# Patient Record
Sex: MALE | Race: BLACK OR AFRICAN AMERICAN | Employment: FULL TIME | URBAN - METROPOLITAN AREA
[De-identification: names, ages, dates, MRNs, and addresses within clinical notes are randomized per-mention and may not be internally consistent; named-entity substitution may affect disease eponyms.]

---

## 2024-04-07 ENCOUNTER — APPOINTMENT (OUTPATIENT)
Dept: CT IMAGING | Facility: HOSPITAL | Age: 29
End: 2024-04-07
Attending: EMERGENCY MEDICINE

## 2024-04-07 ENCOUNTER — HOSPITAL ENCOUNTER (INPATIENT)
Facility: HOSPITAL | Age: 29
LOS: 2 days | Discharge: HOME | End: 2024-04-09
Attending: EMERGENCY MEDICINE | Admitting: HOSPITALIST

## 2024-04-07 DIAGNOSIS — R74.01 TRANSAMINITIS: Primary | ICD-10-CM

## 2024-04-07 DIAGNOSIS — D75.1 POLYCYTHEMIA: ICD-10-CM

## 2024-04-07 LAB
ALBUMIN SERPL-MCNC: 5.2 G/DL (ref 3.2–4.8)
ALP LIVER SERPL-CCNC: 73 U/L
ALT SERPL-CCNC: 1561 U/L
ANION GAP SERPL CALC-SCNC: 12 MMOL/L (ref 0–18)
AST SERPL-CCNC: 627 U/L (ref ?–34)
BILIRUB DIRECT SERPL-MCNC: 0.6 MG/DL (ref ?–0.3)
BILIRUB SERPL-MCNC: 1.3 MG/DL (ref 0.3–1.2)
BILIRUB UR QL: NEGATIVE
BUN BLD-MCNC: 22 MG/DL (ref 9–23)
BUN/CREAT SERPL: 13.8 (ref 10–20)
CALCIUM BLD-MCNC: 9.9 MG/DL (ref 8.7–10.4)
CHLORIDE SERPL-SCNC: 93 MMOL/L (ref 98–112)
CLARITY UR: CLEAR
CO2 SERPL-SCNC: 30 MMOL/L (ref 21–32)
COLOR UR: YELLOW
CREAT BLD-MCNC: 1.59 MG/DL
EGFRCR SERPLBLD CKD-EPI 2021: 60 ML/MIN/1.73M2 (ref 60–?)
GLUCOSE BLD-MCNC: 108 MG/DL (ref 70–99)
GLUCOSE UR-MCNC: NORMAL MG/DL
HAV IGM SER QL: NONREACTIVE
HBV CORE IGM SER QL: NONREACTIVE
HBV SURFACE AG SERPL QL IA: NONREACTIVE
HCV AB SERPL QL IA: NONREACTIVE
HYALINE CASTS #/AREA URNS AUTO: PRESENT /LPF
KETONES UR-MCNC: 80 MG/DL
LEUKOCYTE ESTERASE UR QL STRIP.AUTO: NEGATIVE
LIPASE SERPL-CCNC: 46 U/L (ref 13–75)
NITRITE UR QL STRIP.AUTO: NEGATIVE
OSMOLALITY SERPL CALC.SUM OF ELEC: 284 MOSM/KG (ref 275–295)
PH UR: 6 [PH] (ref 5–8)
POTASSIUM SERPL-SCNC: 3.1 MMOL/L (ref 3.5–5.1)
PROT SERPL-MCNC: 9.3 G/DL (ref 5.7–8.2)
PROT UR-MCNC: 100 MG/DL
SODIUM SERPL-SCNC: 135 MMOL/L (ref 136–145)
SP GR UR STRIP: >1.03 (ref 1–1.03)
UROBILINOGEN UR STRIP-ACNC: 3

## 2024-04-07 PROCEDURE — 85060 BLOOD SMEAR INTERPRETATION: CPT | Performed by: EMERGENCY MEDICINE

## 2024-04-07 PROCEDURE — 96374 THER/PROPH/DIAG INJ IV PUSH: CPT

## 2024-04-07 PROCEDURE — 74177 CT ABD & PELVIS W/CONTRAST: CPT | Performed by: EMERGENCY MEDICINE

## 2024-04-07 PROCEDURE — 80048 BASIC METABOLIC PNL TOTAL CA: CPT | Performed by: EMERGENCY MEDICINE

## 2024-04-07 PROCEDURE — 99285 EMERGENCY DEPT VISIT HI MDM: CPT

## 2024-04-07 PROCEDURE — 96361 HYDRATE IV INFUSION ADD-ON: CPT

## 2024-04-07 PROCEDURE — 80076 HEPATIC FUNCTION PANEL: CPT | Performed by: EMERGENCY MEDICINE

## 2024-04-07 PROCEDURE — 85025 COMPLETE CBC W/AUTO DIFF WBC: CPT | Performed by: EMERGENCY MEDICINE

## 2024-04-07 PROCEDURE — 80074 ACUTE HEPATITIS PANEL: CPT | Performed by: EMERGENCY MEDICINE

## 2024-04-07 PROCEDURE — 81001 URINALYSIS AUTO W/SCOPE: CPT | Performed by: EMERGENCY MEDICINE

## 2024-04-07 PROCEDURE — 96375 TX/PRO/DX INJ NEW DRUG ADDON: CPT

## 2024-04-07 PROCEDURE — 83690 ASSAY OF LIPASE: CPT | Performed by: EMERGENCY MEDICINE

## 2024-04-07 RX ORDER — HEPARIN SODIUM 5000 [USP'U]/ML
5000 INJECTION, SOLUTION INTRAVENOUS; SUBCUTANEOUS EVERY 8 HOURS SCHEDULED
Status: DISCONTINUED | OUTPATIENT
Start: 2024-04-07 | End: 2024-04-09

## 2024-04-07 RX ORDER — BISACODYL 10 MG
10 SUPPOSITORY, RECTAL RECTAL
Status: DISCONTINUED | OUTPATIENT
Start: 2024-04-07 | End: 2024-04-09

## 2024-04-07 RX ORDER — ONDANSETRON 2 MG/ML
4 INJECTION INTRAMUSCULAR; INTRAVENOUS ONCE
Status: COMPLETED | OUTPATIENT
Start: 2024-04-07 | End: 2024-04-07

## 2024-04-07 RX ORDER — ACETAMINOPHEN 500 MG
500 TABLET ORAL EVERY 4 HOURS PRN
Status: DISCONTINUED | OUTPATIENT
Start: 2024-04-07 | End: 2024-04-08

## 2024-04-07 RX ORDER — MORPHINE SULFATE 2 MG/ML
2 INJECTION, SOLUTION INTRAMUSCULAR; INTRAVENOUS ONCE
Status: COMPLETED | OUTPATIENT
Start: 2024-04-07 | End: 2024-04-07

## 2024-04-07 RX ORDER — MORPHINE SULFATE 4 MG/ML
4 INJECTION, SOLUTION INTRAMUSCULAR; INTRAVENOUS EVERY 2 HOUR PRN
Status: DISCONTINUED | OUTPATIENT
Start: 2024-04-07 | End: 2024-04-09

## 2024-04-07 RX ORDER — ONDANSETRON 2 MG/ML
4 INJECTION INTRAMUSCULAR; INTRAVENOUS EVERY 6 HOURS PRN
Status: DISCONTINUED | OUTPATIENT
Start: 2024-04-07 | End: 2024-04-09

## 2024-04-07 RX ORDER — POLYETHYLENE GLYCOL 3350 17 G/17G
17 POWDER, FOR SOLUTION ORAL DAILY PRN
Status: DISCONTINUED | OUTPATIENT
Start: 2024-04-07 | End: 2024-04-09

## 2024-04-07 RX ORDER — MORPHINE SULFATE 2 MG/ML
1 INJECTION, SOLUTION INTRAMUSCULAR; INTRAVENOUS EVERY 2 HOUR PRN
Status: DISCONTINUED | OUTPATIENT
Start: 2024-04-07 | End: 2024-04-09

## 2024-04-07 RX ORDER — MORPHINE SULFATE 2 MG/ML
2 INJECTION, SOLUTION INTRAMUSCULAR; INTRAVENOUS EVERY 2 HOUR PRN
Status: DISCONTINUED | OUTPATIENT
Start: 2024-04-07 | End: 2024-04-09

## 2024-04-07 RX ORDER — PROCHLORPERAZINE EDISYLATE 5 MG/ML
5 INJECTION INTRAMUSCULAR; INTRAVENOUS EVERY 8 HOURS PRN
Status: DISCONTINUED | OUTPATIENT
Start: 2024-04-07 | End: 2024-04-09

## 2024-04-07 RX ORDER — SENNOSIDES 8.6 MG
17.2 TABLET ORAL NIGHTLY PRN
Status: DISCONTINUED | OUTPATIENT
Start: 2024-04-07 | End: 2024-04-09

## 2024-04-07 RX ORDER — SODIUM CHLORIDE 9 MG/ML
INJECTION, SOLUTION INTRAVENOUS CONTINUOUS
Status: DISCONTINUED | OUTPATIENT
Start: 2024-04-07 | End: 2024-04-09

## 2024-04-07 NOTE — ED QUICK NOTES
Orders for admission, patient is aware of plan and ready to go upstairs. Any questions, please call ED ERIN garay  at extension [phone].   Type of COVID test sent:  COVID Suspicion level: Low/High    Titratable drug(s) infusing:  Rate:    LOC at time of transport:ax4    Other pertinent information

## 2024-04-07 NOTE — ED PROVIDER NOTES
Patient Seen in: [facility] Emergency Department      History     Chief Complaint   Patient presents with    Abdominal Pain     Stated Complaint: NVD    Subjective:   HPI    Patient is a 28-year-old male that complains of nausea and vomiting and abdominal pain for the last 4 days.  No fever.  He states that 5 days ago he had watery stools but not since.  He states he has not been eating and drinking very much.  No urinary symptoms    Patient denies alcohol use he does smoke marijuana    Objective:   History reviewed. No pertinent past medical history.           History reviewed. No pertinent surgical history.             Social History     Socioeconomic History    Marital status: Life Partner   Tobacco Use    Smoking status: Never     Passive exposure: Never    Smokeless tobacco: Never   Vaping Use    Vaping Use: Never used   Substance and Sexual Activity    Alcohol use: Never    Drug use: Not Currently     Types: Cannabis              Review of Systems    Positive for stated complaint: NVD  Other systems are as noted in HPI.  Constitutional and vital signs reviewed.      All other systems reviewed and negative except as noted above.    Physical Exam     ED Triage Vitals [04/07/24 1311]   BP (!) 125/91   Pulse 104   Resp 16   Temp 97.6 °F (36.4 °C)   Temp src Oral   SpO2 98 %   O2 Device None (Room air)       Current:/82   Pulse 78   Temp 97.6 °F (36.4 °C) (Oral)   Resp 16   Ht 182.9 cm (6')   Wt 97.5 kg   SpO2 98%   BMI 29.16 kg/m²         Physical Exam    Constitutional: Oriented to person, place, and time. Appears well-developed and well-nourished.   HEENT:   Head: Normocephalic and atraumatic.   Right Ear: External ear normal.   Left Ear: External ear normal.   Nose: Nose normal.   Mouth/Throat: Oropharynx is clear and moist.   Eyes: Conjunctivae and EOM are normal. Pupils are equal, round, and reactive to light.   Neck: Neck supple.   Cardiovascular: Normal rate, regular rhythm, normal  heart sounds and intact distal pulses.    Pulmonary/Chest: Effort normal and breath sounds normal. No respiratory distress.   Abdominal: Soft. Bowel sounds are normal. Exhibits no distension and no mass. There is diffuse abdominal tenderness. There is no rebound and no guarding.   Musculoskeletal: Normal range of motion. Exhibits no edema or tenderness.   Lymphadenopathy: No cervical adenopathy.   Neurological: Alert and oriented to person, place, and time. Normal reflexes. No cranial nerve deficit. No motor os sensory defecits noted Coordination normal.   Skin: Skin is warm and dry.   Psychiatric: Normal mood and affect. Behavior is normal. Judgment and thought content normal.   Nursing note and vitals reviewed.      ED Course     Labs Reviewed   BASIC METABOLIC PANEL (8) - Abnormal; Notable for the following components:       Result Value    Glucose 108 (*)     Sodium 135 (*)     Potassium 3.1 (*)     Chloride 93 (*)     Creatinine 1.59 (*)     All other components within normal limits   HEPATIC FUNCTION PANEL (7) - Abnormal; Notable for the following components:     (*)     ALT 1,561 (*)     Bilirubin, Total 1.3 (*)     Bilirubin, Direct 0.6 (*)     Total Protein 9.3 (*)     Albumin 5.2 (*)     All other components within normal limits   URINALYSIS, ROUTINE - Abnormal; Notable for the following components:    Spec Gravity >1.030 (*)     Ketones Urine 80 (*)     Blood Urine 1+ (*)     Protein Urine 100 (*)     Urobilinogen Urine 3 (*)     WBC Urine 6-10 (*)     RBC Urine 3-5 (*)     Squamous Epi. Cells Few (*)     Hyaline Casts Present (*)     All other components within normal limits   CBC W/ DIFFERENTIAL - Abnormal; Notable for the following components:    RBC 7.34 (*)     HGB 21.3 (*)     HCT 57.8 (*)     MCV 78.7 (*)     RDW 15.3 (*)     All other components within normal limits   LIPASE - Normal   CBC WITH DIFFERENTIAL WITH PLATELET    Narrative:     The following orders were created for panel order  CBC With Differential With Platelet.  Procedure                               Abnormality         Status                     ---------                               -----------         ------                     CBC W/ DIFFERENTIAL[[id number]]          Abnormal            Preliminary result           Please view results for these tests on the individual orders.   SCAN SLIDE   MD BLOOD SMEAR CONSULT   HEPATITIS PANEL, ACUTE (4)   RAINBOW DRAW LAVENDER   RAINBOW DRAW LIGHT GREEN   RAINBOW DRAW GOLD   RAINBOW DRAW BLUE                      MDM      Use of independent historian:     I personally reviewed and interpreted the images : Normal appendix normal gallbladder on CT    CT ABDOMEN+PELVIS(CONTRAST ONLY)(CPT=74177)    Result Date: 4/7/2024  CONCLUSION:   Mild colitis involving nearly the entire colon.  Infectious versus inflammatory etiologies are felt to be most likely.  Normal appendix.  No obstruction     Dictated by (CST): Hernandez Love MD on 4/07/2024 at 6:33 PM     Finalized by (CST): Hernandez Love MD on 4/07/2024 at 6:35 PM           Vitals:    04/07/24 1545 04/07/24 1800 04/07/24 1815 04/07/24 1830   BP: (!) 129/100 (!) 107/95 127/73 137/82   Pulse: 90 85 75 78   Resp: 16 16 16    Temp:       TempSrc:       SpO2: 98% 99% 98% 98%   Weight:       Height:         *I personally reviewed and interpreted all ED vitals.    Pulse Ox: 98%, Room air, Normal     EKG interpretation above independently interpreted by me    Monitor Interpretation:   normal sinus rhythm independently interpreted by me    Differential Diagnosis/ Diagnostic Considerations: Abdominal pain and nausea vomiting consider gastritis consider gastritis consider pancreatitis consider side effect of marijuana's use    Medical Record Review: I personally reviewed available prior medical records for any recent pertinent discharge summaries, testing, and procedures and reviewed those reports and found .    Complicating Factors: The patient already has   which contribute to the complexity of this ED evaluation.    Social determinants of health:    Prescription drug management:      Shared Decision Making:    ED Course: Given findings shared with patient and family.    Discussion of management with other healthcare providers: I spoke with Dr. Macdonald the hospitalist will admit.  GI consulted    Condition upon leaving the department: Stable    Admission disposition: 4/7/2024  6:52 PM                                        Medical Decision Making      Disposition and Plan     Clinical Impression:  1. Transaminitis    2. Polycythemia         Disposition:  Admit  4/7/2024  6:52 pm    Follow-up:  No follow-up provider specified.        Medications Prescribed:  There are no discharge medications for this patient.                        Hospital Problems       Present on Admission             ICD-10-CM Noted POA    * (Principal) Transaminitis R74.01 4/7/2024 Unknown

## 2024-04-08 LAB
A1AT SERPL-MCNC: 136 MG/DL (ref 90–200)
ADENOVIRUS F 40/41 PCR: NEGATIVE
ALBUMIN SERPL-MCNC: 3.9 G/DL (ref 3.2–4.8)
ALBUMIN/GLOB SERPL: 1.4 {RATIO} (ref 1–2)
ALP LIVER SERPL-CCNC: 50 U/L
ALT SERPL-CCNC: 1033 U/L
ANION GAP SERPL CALC-SCNC: 8 MMOL/L (ref 0–18)
AST SERPL-CCNC: 419 U/L (ref ?–34)
ASTROVIRUS PCR: NEGATIVE
BASOPHILS # BLD AUTO: 0.04 X10(3) UL (ref 0–0.2)
BASOPHILS # BLD AUTO: 0.05 X10(3) UL (ref 0–0.2)
BASOPHILS NFR BLD AUTO: 0.7 %
BASOPHILS NFR BLD AUTO: 0.8 %
BILIRUB SERPL-MCNC: 0.9 MG/DL (ref 0.3–1.2)
BUN BLD-MCNC: 14 MG/DL (ref 9–23)
BUN/CREAT SERPL: 15.1 (ref 10–20)
C CAYETANENSIS DNA SPEC QL NAA+PROBE: NEGATIVE
C DIFF GDH + TOXINS A+B STL QL IA.RAPID: NOT DETECTED
C DIFF TOX B STL QL: POSITIVE
CALCIUM BLD-MCNC: 8.2 MG/DL (ref 8.7–10.4)
CAMPY SP DNA.DIARRHEA STL QL NAA+PROBE: NEGATIVE
CERULOPLASMIN SERPL-MCNC: 20.7 MG/DL (ref 20–60)
CHLORIDE SERPL-SCNC: 103 MMOL/L (ref 98–112)
CO2 SERPL-SCNC: 27 MMOL/L (ref 21–32)
CREAT BLD-MCNC: 0.93 MG/DL
CRYPTOSP DNA SPEC QL NAA+PROBE: NEGATIVE
DEPRECATED HBV CORE AB SER IA-ACNC: 628.5 NG/ML
DEPRECATED RDW RBC AUTO: 37.9 FL (ref 35.1–46.3)
DEPRECATED RDW RBC AUTO: 38.8 FL (ref 35.1–46.3)
EAEC PAA PLAS AGGR+AATA ST NAA+NON-PRB: NEGATIVE
EC STX1+STX2 + H7 FLIC SPEC NAA+PROBE: NEGATIVE
EGFRCR SERPLBLD CKD-EPI 2021: 115 ML/MIN/1.73M2 (ref 60–?)
ENTAMOEBA HISTOLYTICA PCR: NEGATIVE
EOSINOPHIL # BLD AUTO: 0 X10(3) UL (ref 0–0.7)
EOSINOPHIL # BLD AUTO: 0.04 X10(3) UL (ref 0–0.7)
EOSINOPHIL NFR BLD AUTO: 0 %
EOSINOPHIL NFR BLD AUTO: 0.5 %
EPEC EAE GENE STL QL NAA+NON-PROBE: NEGATIVE
ERYTHROCYTE [DISTWIDTH] IN BLOOD BY AUTOMATED COUNT: 13.7 % (ref 11–15)
ERYTHROCYTE [DISTWIDTH] IN BLOOD BY AUTOMATED COUNT: 15.3 % (ref 11–15)
ETEC LTA+ST1A+ST1B TOX ST NAA+NON-PROBE: NEGATIVE
GIARDIA LAMBLIA PCR: NEGATIVE
GLOBULIN PLAS-MCNC: 2.7 G/DL (ref 2.8–4.4)
GLUCOSE BLD-MCNC: 85 MG/DL (ref 70–99)
HCT VFR BLD AUTO: 48.4 %
HCT VFR BLD AUTO: 57.8 %
HETEROPH AB SER QL: NEGATIVE
HGB BLD-MCNC: 17.8 G/DL
HGB BLD-MCNC: 21.3 G/DL
IMM GRANULOCYTES # BLD AUTO: 0.01 X10(3) UL (ref 0–1)
IMM GRANULOCYTES # BLD AUTO: 0.03 X10(3) UL (ref 0–1)
IMM GRANULOCYTES NFR BLD: 0.2 %
IMM GRANULOCYTES NFR BLD: 0.4 %
LYMPHOCYTES # BLD AUTO: 1.35 X10(3) UL (ref 1–4)
LYMPHOCYTES # BLD AUTO: 1.52 X10(3) UL (ref 1–4)
LYMPHOCYTES NFR BLD AUTO: 17.8 %
LYMPHOCYTES NFR BLD AUTO: 29.5 %
MCH RBC QN AUTO: 28.9 PG (ref 26–34)
MCH RBC QN AUTO: 29 PG (ref 26–34)
MCHC RBC AUTO-ENTMCNC: 36.8 G/DL (ref 31–37)
MCHC RBC AUTO-ENTMCNC: 36.9 G/DL (ref 31–37)
MCV RBC AUTO: 78.6 FL
MCV RBC AUTO: 78.7 FL
MONOCYTES # BLD AUTO: 0.66 X10(3) UL (ref 0.1–1)
MONOCYTES # BLD AUTO: 0.85 X10(3) UL (ref 0.1–1)
MONOCYTES NFR BLD AUTO: 11.2 %
MONOCYTES NFR BLD AUTO: 12.8 %
NEUTROPHILS # BLD AUTO: 2.92 X10 (3) UL (ref 1.5–7.7)
NEUTROPHILS # BLD AUTO: 2.92 X10(3) UL (ref 1.5–7.7)
NEUTROPHILS # BLD AUTO: 5.26 X10 (3) UL (ref 1.5–7.7)
NEUTROPHILS # BLD AUTO: 5.26 X10(3) UL (ref 1.5–7.7)
NEUTROPHILS NFR BLD AUTO: 56.7 %
NEUTROPHILS NFR BLD AUTO: 69.4 %
NOROVIRUS GI/GII PCR: NEGATIVE
OSMOLALITY SERPL CALC.SUM OF ELEC: 286 MOSM/KG (ref 275–295)
P SHIGELLOIDES DNA STL QL NAA+PROBE: NEGATIVE
PLATELET # BLD AUTO: 112 10(3)UL (ref 150–450)
PLATELET # BLD AUTO: 173 10(3)UL (ref 150–450)
POTASSIUM SERPL-SCNC: 3.4 MMOL/L (ref 3.5–5.1)
POTASSIUM SERPL-SCNC: 3.4 MMOL/L (ref 3.5–5.1)
PROT SERPL-MCNC: 6.6 G/DL (ref 5.7–8.2)
RBC # BLD AUTO: 6.16 X10(6)UL
RBC # BLD AUTO: 7.34 X10(6)UL
ROTAVIRUS A PCR: NEGATIVE
SALMONELLA DNA SPEC QL NAA+PROBE: NEGATIVE
SAPOVIRUS PCR: NEGATIVE
SHIGELLA SP+EIEC IPAH ST NAA+NON-PROBE: NEGATIVE
SODIUM SERPL-SCNC: 138 MMOL/L (ref 136–145)
V CHOLERAE DNA SPEC QL NAA+PROBE: NEGATIVE
VIBRIO DNA SPEC NAA+PROBE: NEGATIVE
WBC # BLD AUTO: 5.2 X10(3) UL (ref 4–11)
WBC # BLD AUTO: 7.6 X10(3) UL (ref 4–11)
YERSINIA DNA SPEC NAA+PROBE: NEGATIVE

## 2024-04-08 PROCEDURE — 86308 HETEROPHILE ANTIBODY SCREEN: CPT | Performed by: INTERNAL MEDICINE

## 2024-04-08 PROCEDURE — 82728 ASSAY OF FERRITIN: CPT | Performed by: INTERNAL MEDICINE

## 2024-04-08 PROCEDURE — 82103 ALPHA-1-ANTITRYPSIN TOTAL: CPT | Performed by: INTERNAL MEDICINE

## 2024-04-08 PROCEDURE — 87493 C DIFF AMPLIFIED PROBE: CPT | Performed by: HOSPITALIST

## 2024-04-08 PROCEDURE — 86364 TISS TRNSGLTMNASE EA IG CLAS: CPT | Performed by: INTERNAL MEDICINE

## 2024-04-08 PROCEDURE — 83516 IMMUNOASSAY NONANTIBODY: CPT | Performed by: INTERNAL MEDICINE

## 2024-04-08 PROCEDURE — 87507 IADNA-DNA/RNA PROBE TQ 12-25: CPT | Performed by: HOSPITALIST

## 2024-04-08 PROCEDURE — 86038 ANTINUCLEAR ANTIBODIES: CPT | Performed by: INTERNAL MEDICINE

## 2024-04-08 PROCEDURE — 82390 ASSAY OF CERULOPLASMIN: CPT | Performed by: INTERNAL MEDICINE

## 2024-04-08 PROCEDURE — 80053 COMPREHEN METABOLIC PANEL: CPT | Performed by: HOSPITALIST

## 2024-04-08 PROCEDURE — 84132 ASSAY OF SERUM POTASSIUM: CPT | Performed by: HOSPITALIST

## 2024-04-08 PROCEDURE — 86665 EPSTEIN-BARR CAPSID VCA: CPT | Performed by: INTERNAL MEDICINE

## 2024-04-08 PROCEDURE — 85025 COMPLETE CBC W/AUTO DIFF WBC: CPT | Performed by: HOSPITALIST

## 2024-04-08 PROCEDURE — 86664 EPSTEIN-BARR NUCLEAR ANTIGEN: CPT | Performed by: INTERNAL MEDICINE

## 2024-04-08 RX ORDER — VANCOMYCIN HYDROCHLORIDE 125 MG/1
125 CAPSULE ORAL 4 TIMES DAILY
Status: DISCONTINUED | OUTPATIENT
Start: 2024-04-08 | End: 2024-04-09

## 2024-04-08 RX ORDER — POTASSIUM CHLORIDE 20 MEQ/1
40 TABLET, EXTENDED RELEASE ORAL ONCE
Status: COMPLETED | OUTPATIENT
Start: 2024-04-08 | End: 2024-04-08

## 2024-04-08 NOTE — PLAN OF CARE
Problem: Patient Centered Care  Goal: Patient preferences are identified and integrated in the patient's plan of care  Description: Interventions:  - What would you like us to know as we care for you?  - Provide timely, complete, and accurate information to patient/family  - Incorporate patient and family knowledge, values, beliefs, and cultural backgrounds into the planning and delivery of care  - Encourage patient/family to participate in care and decision-making at the level they choose  - Honor patient and family perspectives and choices  Outcome: Progressing    No complaints of n/v. Po Abx. IVF.

## 2024-04-08 NOTE — CM/SW NOTE
SW received MDO to assist pt with finances.  SW  called Changed financials, x [phone], left message for Change with patient information.     / to remain available for support and/or discharge planning.     Octavia Vasquez, MSW, LSW   x [phone]

## 2024-04-08 NOTE — CONSULTS
GASTROENTEROLOGY CONSULTATION  [facility]    Darby Harding Patient Status:  Inpatient   Date of Birth 4/18/1995 MRN [id number]   Location [facility] 5SW/SE Attending Amber Hermosillo DO   Hosp Day # 1 PCP No primary care provider on file.     Date of Consultation:  4/8/2024    History of Present Illness:    Darby Harding is a 28 year old male with nausea, vomiting, diarrhea and elevated LFT's.     The patient presented to the emergency room yesterday with nausea, vomiting and abdominal pain.  His history started 5 days ago when he had watery, nonbloody diarrhea.  This subsided and he was noted to have ongoing nausea and vomiting with abdominal pain that persisted until he came to the emergency room.    Since his admission, his nausea, vomiting and abdominal pain have resolved.    His labs on admission were remarkable for a hemoglobin of 21.3 and elevated transaminases.  His AST last night was 627 and ALT was 1561.  His AST today is 419 and ALT is 1033.  His bilirubin was 1.3 last night is 0.3 today.    The patient has no previous history of GI or liver disease.  He takes no medications including herbal or diet supplements.  There is no significant alcohol use.    He has not been exposed to enteric pathogens, eating poorly cooked or raw food or had recent travel.  There is no family history of liver disease.  He denies recreational drug use.    Of note, he admits to smoking marijuana daily.    Past Medical History:     History reviewed. No pertinent past medical history.    Medications:     Prior to Admission medications    Not on File       Current Facility-Administered Medications   Medication Dose Route Frequency    potassium chloride (K-Dur) tab 40 mEq  40 mEq Oral Once    sodium chloride 0.9% infusion   Intravenous Continuous    heparin (Porcine) 5000 UNIT/ML injection 5,000 Units  5,000 Units Subcutaneous Q8H [facility]    ondansetron (Zofran) 4 MG/2ML injection 4 mg  4 mg Intravenous Q6H PRN     prochlorperazine (Compazine) 10 MG/2ML injection 5 mg  5 mg Intravenous Q8H PRN    polyethylene glycol (PEG 3350) (Miralax) 17 g oral packet 17 g  17 g Oral Daily PRN    sennosides (Senokot) tab 17.2 mg  17.2 mg Oral Nightly PRN    bisacodyl (Dulcolax) 10 MG rectal suppository 10 mg  10 mg Rectal Daily PRN    morphINE PF 2 MG/ML injection 1 mg  1 mg Intravenous Q2H PRN    Or    morphINE PF 2 MG/ML injection 2 mg  2 mg Intravenous Q2H PRN    Or    morphINE PF 4 MG/ML injection 4 mg  4 mg Intravenous Q2H PRN        Family History:     No family history on file.    Social History:    Social History     Socioeconomic History    Marital status: Life Partner   Tobacco Use    Smoking status: Never     Passive exposure: Never    Smokeless tobacco: Never   Vaping Use    Vaping Use: Never used   Substance and Sexual Activity    Alcohol use: Never    Drug use: Not Currently     Types: Cannabis     Social Determinants of Health     Food Insecurity: No Food Insecurity (4/7/2024)    Food Insecurity     Food Insecurity: Never true   Transportation Needs: No Transportation Needs (4/7/2024)    Transportation Needs     Lack of Transportation: No   Housing Stability: Low Risk  (4/7/2024)    Housing Stability     Housing Instability: No       ROS:     A comprehensive 10 point review of systems was completed.  Pertinent positives and negatives noted in the the HPI.      Physical Exam:    Vital Signs:   Vitals:    04/08/24 0431   BP: 134/74   Pulse: 89   Resp: 17   Temp: 98.3 °F (36.8 °C)     General: Alert, oriented and in no acute distress  HEENT: normocephalic; non-icteric; oral cavity free of lesions  Neck:  Supple; no thyromegaly or adenopathy  Lungs:  Clear to ausculation and percussion  Heart:  Normal S1S2  Abdomen:  Soft; non-tender; no masses or hepatosplenomegaly; non-distended  Extremities:  No edema    Labs:      Lab Results   Component Value Date    WBC 7.6 04/07/2024    HGB 21.3 04/07/2024    HCT 57.8 04/07/2024    PLT  173.0 04/07/2024    CREATSERUM 0.93 04/08/2024    BUN 14 04/08/2024     04/08/2024    K 3.4 04/08/2024    K 3.4 04/08/2024     04/08/2024    CO2 27.0 04/08/2024    GLU 85 04/08/2024    CA 8.2 04/08/2024    ALB 3.9 04/08/2024    ALKPHO 50 04/08/2024    BILT 0.9 04/08/2024    TP 6.6 04/08/2024     04/08/2024    ALT 1,033 04/08/2024    LIP 46 04/07/2024        RADIOLOGY:    PROCEDURE:  CT ABDOMEN + PELVIS (CONTRAST ONLY) (CPT=74177)     COMPARISON: None.     INDICATIONS:  NVD     TECHNIQUE:    CT images of the abdomen and pelvis were obtained with non-ionic intravenous contrast material.  Automated exposure control for dose reduction was used. Adjustment of the mA and/or kV was done based on the patient's size. Use of iterative   reconstruction technique for dose reduction was used.  Dose information is transmitted to the ACR (American College of Radiology) NRDR (National Radiology Data Registry) which includes the Dose Index Registry.     FINDINGS:          LIVER:  Unremarkable without focal mass.  GALLBLADDER:            Normal wall thickness.  No pericholecystic fluid.  BILIARY:            No intra-or extrahepatic biliary ductal dilation.  SPLEEN:           Unremarkable  PANCREAS:      The pancreas enhances symmetrically. No ductal dilation.  ADRENALS:      Unremarkable  KIDNEYS:          The kidneys enhance symmetrically. There is no hydronephrosis.    AORTA/VASCULAR:      Normal.  No aneurysm or dissection.         RETROPERITONEUM:  No mass or enlarged adenopathy.    BOWEL:            There is wall thickening involving majority of the colon including the transverse colon, descending and sigmoid colon with mild pericolonic inflammatory fat stranding.  The appendix is normal. There are no inflammatory changes within the right   lower quadrant.  Remainder of the bowel is otherwise unremarkable without dilation or wall thickening.  MESENTERY:   Normal.  No mass or hernia.     PELVIS:              Bladder wall thickening likely due to under distention. No mass or fluid collection.  No enlarged lymph nodes.  BONES:             No significant bony lesion or fracture.  LUNG BASES:  No visible pulmonary or pleural disease.        =====  CONCLUSION:      Mild colitis involving nearly the entire colon.  Infectious versus inflammatory etiologies are felt to be most likely.      Normal appendix.     No obstruction        Dictated by (CST): Hernandez Love MD on 4/07/2024 at 6:33 PM       Finalized by (CST): Hernandez Love MD on 4/07/2024 at 6:35 PM    Assessment:    Abdominal pain, nausea and vomiting  Acute colitis  Elevated liver enzymes    The most likely etiology for the patient's abdominal pain nausea, vomiting and diarrhea is an infectious gastroenteritis.  He is clinically improving with conservative treatment.  An elevated hemoglobin is noted on admission which is likely secondary to hemoconcentration due to poor p.o. intake and dehydration.    The etiology of the elevated liver enzymes is unclear.  Acute hepatitis panel is negative.  His liver enzymes are improving.  Imaging of his liver, pancreas and biliary tree are normal    Plan:    Additional labs to look for other etiologies of his liver enzyme elevation.   Stool cultures.  Continue supportive care.  Follow labs and clinical course.      Paco Ding MD

## 2024-04-08 NOTE — H&P
[facility] Hospitalist H&P       CC:   Chief Complaint   Patient presents with    Abdominal Pain        PCP: No primary care provider on file.    History of Present Illness: Patient is a 28 year old male with no significant past medical history who presented to the hospital with complaints of nausea, vomiting, diffuse abdominal pain and some diarrhea.  Patient states that his symptoms all started 5 days ago suddenly.  He describes projectile vomiting that was occurring both during the day and also when he would tend to sleep at night.  He had no appetite and therefore had decreased p.o. intake of both food and liquids.  Regarding his abdominal pain he describes it as crampy in nature and said that it was diffuse.  Felt like this pain will get worse when he vomited.  His diarrhea was 5 days ago and today he had a normal bowel movement.  Denied any blood in his emesis or stool.  Denied any sick contacts or any fevers.  Denied go out to eat anywhere recently and nobody around him has similar symptoms.  Upon arrival to the emergency department he was afebrile.  Lab work showed elevated LFTs with ALT 1561, , total bilirubin 1.3, and a creatinine of 1.59.  CBC showed a hemoglobin of 21.3.  CT abdomen pelvis was done which showed diffuse colitis.  Patient was admitted for further medical management.    Upon my evaluation of the patient this morning he is resting comfortably in bed.  Patient states that all the symptoms have resolved and he has not vomited since he was in the emergency department.  He has no abdominal pain.  His appetite has returned and he is requesting to eat.      PMH  History reviewed. No pertinent past medical history.     PSH  History reviewed. No pertinent surgical history.     ALL:  No Known Allergies     Home Medications:  No outpatient medications have been marked as taking for the 4/7/24 encounter (Hospital Encounter).         Soc Hx  Social History     Tobacco Use    Smoking  status: Never     Passive exposure: Never    Smokeless tobacco: Never   Substance Use Topics    Alcohol use: Never        Fam Hx  No family history on file.    Review of Systems  Comprehensive ROS reviewed and negative except for what's stated above.     OBJECTIVE:  /87 (BP Location: Left arm)   Pulse 78   Temp 97.7 °F (36.5 °C) (Oral)   Resp 18   Ht 6' (1.829 m)   Wt 204 lb 12.9 oz (92.9 kg)   SpO2 96%   BMI 27.78 kg/m²   General:  Alert, no distress   Head:  Normocephalic,                Neck: Supple   Lungs:   Clear to auscultation bilaterally.        Heart:  Regular rate and rhythm, S1, S2 normal   Abdomen:   Soft, non-tender. Bowel sounds normal.                  Diagnostic Data:    CBC/Chem  Recent Labs   Lab 04/07/24  1532 04/08/24  0646   WBC 7.6 5.2   HGB 21.3* 17.8*   MCV 78.7* 78.6*   .0 112.0*       Recent Labs   Lab 04/07/24  1532 04/08/24  0646   * 138   K 3.1* 3.4*  3.4*   CL 93* 103   CO2 30.0 27.0   BUN 22 14   CREATSERUM 1.59* 0.93   * 85   CA 9.9 8.2*       Recent Labs   Lab 04/07/24  1532 04/08/24  0646   ALT 1,561* 1,033*   * 419*   ALB 5.2* 3.9       No results for input(s): \"TROP\" in the last 168 hours.    Radiology: CT ABDOMEN+PELVIS(CONTRAST ONLY)(CPT=74177)    Result Date: 4/7/2024  CONCLUSION:   Mild colitis involving nearly the entire colon.  Infectious versus inflammatory etiologies are felt to be most likely.  Normal appendix.  No obstruction     Dictated by (CST): Hernandez Love MD on 4/07/2024 at 6:33 PM     Finalized by (CST): Hernandez Love MD on 4/07/2024 at 6:35 PM             ASSESSMENT / PLAN:   Patient is a 28 year old male with no significant past medical history who presented to the hospital with complaints of nausea, vomiting, diffuse abdominal pain and some diarrhea.     Elevated LFT  - likely from dehydration and possible viral infection  - on admission LFT as follows: ALT 1561, , total bilirubin 1.3 and already improving  -  hepatitis panel is negative  - CT A/P reviewed  - evaluated by GI colleagues, appreciate recommendations  - EBV, KARON, ASMA, alpha 1, TTG, ferritin and ceruloplasmin pending    Acute colitis  - appears infectious and already improving  - check stool for c dif  - supportive care, no need for antibiotics    ROXANE  - 2/2 dehydration    Elevated hgb  - already improving, from hemoconcentration    FN:  - IVF: NS  - Diet: CLD    DVT Prophy: SCD  Lines: PIV    Dispo: pending clinical course, patients LFTs are improving and his symptoms have resolved. Diet changed to CLD and will advance as tolerated, keep on IVF for now, anticipate dc early tomorrow morning    Outpatient records or previous hospital records reviewed.     Further recommendations pending patient's clinical course.  [facility] hospitalist to continue to follow patient while in house    Patient and/or patient's family given opportunity to ask questions and note understanding and agreeing with therapeutic plan as outlined    Thank You,  Amber Hermosillo DO    Hospitalist with [facility]  Answering Service number: [phone]

## 2024-04-08 NOTE — PLAN OF CARE
Problem: Patient Centered Care  Goal: Patient preferences are identified and integrated in the patient's plan of care  Description: Interventions:  - What would you like us to know as we care for you? From home with family   - Provide timely, complete, and accurate information to patient/family  - Incorporate patient and family knowledge, values, beliefs, and cultural backgrounds into the planning and delivery of care  - Encourage patient/family to participate in care and decision-making at the level they choose  - Honor patient and family perspectives and choices  Outcome: Progressing     Problem: Patient/Family Goals  Goal: Patient/Family Long Term Goal  Description: Patient's Long Term Goal: feel better and go back home     Interventions:  - NPO   - IV fluids   - GI on consult   - See additional Care Plan goals for specific interventions  Outcome: Progressing  Goal: Patient/Family Short Term Goal  Description: Patient's Short Term Goal: feel better     Interventions:   -   - See additional Care Plan goals for specific interventions  Outcome: Progressing     Problem: PAIN - ADULT  Goal: Verbalizes/displays adequate comfort level or patient's stated pain goal  Description: INTERVENTIONS:  - Encourage pt to monitor pain and request assistance  - Assess pain using appropriate pain scale  - Administer analgesics based on type and severity of pain and evaluate response  - Implement non-pharmacological measures as appropriate and evaluate response  - Consider cultural and social influences on pain and pain management  - Manage/alleviate anxiety  - Utilize distraction and/or relaxation techniques  - Monitor for opioid side effects  - Notify MD/LIP if interventions unsuccessful or patient reports new pain  - Anticipate increased pain with activity and pre-medicate as appropriate  Outcome: Progressing     Problem: GASTROINTESTINAL - ADULT  Goal: Minimal or absence of nausea and vomiting  Description: INTERVENTIONS:  -  Maintain adequate hydration with IV or PO as ordered and tolerated  - Nasogastric tube to low intermittent suction as ordered  - Evaluate effectiveness of ordered antiemetic medications  - Provide nonpharmacologic comfort measures as appropriate  - Advance diet as tolerated, if ordered  - Obtain nutritional consult as needed  - Evaluate fluid balance  Outcome: Progressing  Goal: Maintains or returns to baseline bowel function  Description: INTERVENTIONS:  - Assess bowel function  - Maintain adequate hydration with IV or PO as ordered and tolerated  - Evaluate effectiveness of GI medications  - Encourage mobilization and activity  - Obtain nutritional consult as needed  - Establish a toileting routine/schedule  - Consider collaborating with pharmacy to review patient's medication profile  Outcome: Progressing  Goal: Maintains adequate nutritional intake (undernourished)  Description: INTERVENTIONS:  - Monitor percentage of each meal consumed  - Identify factors contributing to decreased intake, treat as appropriate  - Assist with meals as needed  - Monitor I&O, WT and lab values  - Obtain nutritional consult as needed  - Optimize oral hygiene and moisture  - Encourage food from home; allow for food preferences  - Enhance eating environment  Outcome: Progressing   Patient alert and oriented, vital signs stable , no c/o of nausea or pain , IV fluids , call light within reach

## 2024-04-09 VITALS
RESPIRATION RATE: 16 BRPM | HEIGHT: 72 IN | HEART RATE: 83 BPM | TEMPERATURE: 98 F | BODY MASS INDEX: 28.84 KG/M2 | OXYGEN SATURATION: 97 % | DIASTOLIC BLOOD PRESSURE: 77 MMHG | WEIGHT: 212.94 LBS | SYSTOLIC BLOOD PRESSURE: 133 MMHG

## 2024-04-09 LAB
ACTIN SMOOTH MUSCLE AB: 7 UNITS
ALBUMIN SERPL-MCNC: 3.8 G/DL (ref 3.2–4.8)
ALBUMIN SERPL-MCNC: 3.9 G/DL (ref 3.2–4.8)
ALBUMIN/GLOB SERPL: 1.4 {RATIO} (ref 1–2)
ALP LIVER SERPL-CCNC: 49 U/L
ALP LIVER SERPL-CCNC: 51 U/L
ALT SERPL-CCNC: 715 U/L
ALT SERPL-CCNC: 729 U/L
ANION GAP SERPL CALC-SCNC: 7 MMOL/L (ref 0–18)
AST SERPL-CCNC: 224 U/L (ref ?–34)
AST SERPL-CCNC: 236 U/L (ref ?–34)
BASOPHILS # BLD AUTO: 0.04 X10(3) UL (ref 0–0.2)
BASOPHILS NFR BLD AUTO: 0.7 %
BILIRUB DIRECT SERPL-MCNC: 0.5 MG/DL (ref ?–0.3)
BILIRUB DIRECT SERPL-MCNC: 0.5 MG/DL (ref ?–0.3)
BILIRUB SERPL-MCNC: 0.9 MG/DL (ref 0.3–1.2)
BILIRUB SERPL-MCNC: 1 MG/DL (ref 0.3–1.2)
BUN BLD-MCNC: 6 MG/DL (ref 9–23)
BUN/CREAT SERPL: 7.2 (ref 10–20)
CALCIUM BLD-MCNC: 8.5 MG/DL (ref 8.7–10.4)
CHLORIDE SERPL-SCNC: 104 MMOL/L (ref 98–112)
CO2 SERPL-SCNC: 24 MMOL/L (ref 21–32)
CREAT BLD-MCNC: 0.83 MG/DL
DEPRECATED RDW RBC AUTO: 39.7 FL (ref 35.1–46.3)
EGFRCR SERPLBLD CKD-EPI 2021: 122 ML/MIN/1.73M2 (ref 60–?)
EOSINOPHIL # BLD AUTO: 0 X10(3) UL (ref 0–0.7)
EOSINOPHIL NFR BLD AUTO: 0 %
ERYTHROCYTE [DISTWIDTH] IN BLOOD BY AUTOMATED COUNT: 13.4 % (ref 11–15)
GLOBULIN PLAS-MCNC: 2.7 G/DL (ref 2.8–4.4)
GLUCOSE BLD-MCNC: 82 MG/DL (ref 70–99)
HCT VFR BLD AUTO: 47.5 %
HGB BLD-MCNC: 17 G/DL
IMM GRANULOCYTES # BLD AUTO: 0.02 X10(3) UL (ref 0–1)
IMM GRANULOCYTES NFR BLD: 0.3 %
IRON SATN MFR SERPL: 39 %
IRON SERPL-MCNC: 103 UG/DL
LYMPHOCYTES # BLD AUTO: 2.1 X10(3) UL (ref 1–4)
LYMPHOCYTES NFR BLD AUTO: 36.3 %
MCH RBC QN AUTO: 29.1 PG (ref 26–34)
MCHC RBC AUTO-ENTMCNC: 35.8 G/DL (ref 31–37)
MCV RBC AUTO: 81.2 FL
MONOCYTES # BLD AUTO: 0.75 X10(3) UL (ref 0.1–1)
MONOCYTES NFR BLD AUTO: 13 %
NEUTROPHILS # BLD AUTO: 2.88 X10 (3) UL (ref 1.5–7.7)
NEUTROPHILS # BLD AUTO: 2.88 X10(3) UL (ref 1.5–7.7)
NEUTROPHILS NFR BLD AUTO: 49.7 %
NUCLEAR IGG TITR SER IF: NEGATIVE {TITER}
OSMOLALITY SERPL CALC.SUM OF ELEC: 277 MOSM/KG (ref 275–295)
PLATELET # BLD AUTO: 104 10(3)UL (ref 150–450)
PLATELETS.RETICULATED NFR BLD AUTO: 17.9 % (ref 0–7)
POTASSIUM SERPL-SCNC: 3.3 MMOL/L (ref 3.5–5.1)
POTASSIUM SERPL-SCNC: 3.3 MMOL/L (ref 3.5–5.1)
PROT SERPL-MCNC: 6.4 G/DL (ref 5.7–8.2)
PROT SERPL-MCNC: 6.5 G/DL (ref 5.7–8.2)
RBC # BLD AUTO: 5.85 X10(6)UL
SODIUM SERPL-SCNC: 135 MMOL/L (ref 136–145)
TIBC SERPL-MCNC: 267 UG/DL (ref 250–425)
TRANSFERRIN SERPL-MCNC: 179 MG/DL (ref 215–365)
TTG IGA SER-ACNC: 0.3 U/ML (ref ?–7)
WBC # BLD AUTO: 5.8 X10(3) UL (ref 4–11)

## 2024-04-09 PROCEDURE — 85025 COMPLETE CBC W/AUTO DIFF WBC: CPT | Performed by: INTERNAL MEDICINE

## 2024-04-09 PROCEDURE — 82248 BILIRUBIN DIRECT: CPT | Performed by: STUDENT IN AN ORGANIZED HEALTH CARE EDUCATION/TRAINING PROGRAM

## 2024-04-09 PROCEDURE — 81256 HFE GENE: CPT | Performed by: NURSE PRACTITIONER

## 2024-04-09 PROCEDURE — 82248 BILIRUBIN DIRECT: CPT | Performed by: INTERNAL MEDICINE

## 2024-04-09 PROCEDURE — 80053 COMPREHEN METABOLIC PANEL: CPT | Performed by: STUDENT IN AN ORGANIZED HEALTH CARE EDUCATION/TRAINING PROGRAM

## 2024-04-09 PROCEDURE — 83540 ASSAY OF IRON: CPT | Performed by: NURSE PRACTITIONER

## 2024-04-09 PROCEDURE — 84466 ASSAY OF TRANSFERRIN: CPT | Performed by: NURSE PRACTITIONER

## 2024-04-09 PROCEDURE — 84132 ASSAY OF SERUM POTASSIUM: CPT | Performed by: STUDENT IN AN ORGANIZED HEALTH CARE EDUCATION/TRAINING PROGRAM

## 2024-04-09 RX ORDER — VANCOMYCIN HYDROCHLORIDE 125 MG/1
125 CAPSULE ORAL 4 TIMES DAILY
Qty: 36 CAPSULE | Refills: 0 | Status: SHIPPED | OUTPATIENT
Start: 2024-04-09 | End: 2024-04-18

## 2024-04-09 RX ORDER — POTASSIUM CHLORIDE 20 MEQ/1
40 TABLET, EXTENDED RELEASE ORAL ONCE
Status: COMPLETED | OUTPATIENT
Start: 2024-04-09 | End: 2024-04-09

## 2024-04-09 NOTE — PLAN OF CARE
Problem: Patient Centered Care  Goal: Patient preferences are identified and integrated in the patient's plan of care  Description: Interventions:  - What would you like us to know as we care for you? From home with family  - Provide timely, complete, and accurate information to patient/family  - Incorporate patient and family knowledge, values, beliefs, and cultural backgrounds into the planning and delivery of care  - Encourage patient/family to participate in care and decision-making at the level they choose  - Honor patient and family perspectives and choices  Outcome: Progressing     Problem: Patient/Family Goals  Goal: Patient/Family Long Term Goal  Description: Patient's Long Term Goal: return home     Interventions:  - GI on consult   - See additional Care Plan goals for specific interventions  Outcome: Progressing  Goal: Patient/Family Short Term Goal  Description: Patient's Short Term Goal: get better     Interventions:   - follow doctors recommendations   - See additional Care Plan goals for specific interventions  Outcome: Progressing     Problem: PAIN - ADULT  Goal: Verbalizes/displays adequate comfort level or patient's stated pain goal  Description: INTERVENTIONS:  - Encourage pt to monitor pain and request assistance  - Assess pain using appropriate pain scale  - Administer analgesics based on type and severity of pain and evaluate response  - Implement non-pharmacological measures as appropriate and evaluate response  - Consider cultural and social influences on pain and pain management  - Manage/alleviate anxiety  - Utilize distraction and/or relaxation techniques  - Monitor for opioid side effects  - Notify MD/LIP if interventions unsuccessful or patient reports new pain  - Anticipate increased pain with activity and pre-medicate as appropriate  Outcome: Progressing     Problem: GASTROINTESTINAL - ADULT  Goal: Minimal or absence of nausea and vomiting  Description: INTERVENTIONS:  - Maintain  adequate hydration with IV or PO as ordered and tolerated  - Nasogastric tube to low intermittent suction as ordered  - Evaluate effectiveness of ordered antiemetic medications  - Provide nonpharmacologic comfort measures as appropriate  - Advance diet as tolerated, if ordered  - Obtain nutritional consult as needed  - Evaluate fluid balance  Outcome: Progressing  Goal: Maintains or returns to baseline bowel function  Description: INTERVENTIONS:  - Assess bowel function  - Maintain adequate hydration with IV or PO as ordered and tolerated  - Evaluate effectiveness of GI medications  - Encourage mobilization and activity  - Obtain nutritional consult as needed  - Establish a toileting routine/schedule  - Consider collaborating with pharmacy to review patient's medication profile  Outcome: Progressing  Goal: Maintains adequate nutritional intake (undernourished)  Description: INTERVENTIONS:  - Monitor percentage of each meal consumed  - Identify factors contributing to decreased intake, treat as appropriate  - Assist with meals as needed  - Monitor I&O, WT and lab values  - Obtain nutritional consult as needed  - Optimize oral hygiene and moisture  - Encourage food from home; allow for food preferences  - Enhance eating environment  Outcome: Progressing   No acute changes in patient status, vital signs stable, patient denies abdominal pain , no nausea or vomiting , patient on low fiber diet tolerating well.

## 2024-04-09 NOTE — PROGRESS NOTES
[facility]  Gastroenterology Progress Note    Darby Harding Patient Status:  Inpatient    1995 MRN [id number]   Location [facility] 5SW/SE Attending Amber Hermosillo,    Hosp Day # 2 PCP No primary care provider on file.     Subjective:  Darby Harding is a(n) 28 year old male.    Current complaints: no current complaints, abdominal pain resolved, having formed bowel movements, tolerating diet    Objective:  Blood pressure 133/77, pulse 83, temperature 98.4 °F (36.9 °C), temperature source Oral, resp. rate 16, height 6' (1.829 m), weight 212 lb 15.4 oz (96.6 kg), SpO2 97%.  Respiratory: no labored breathing  CV: RRR  Abdomen: nondistended, soft, nontender  Extremities: no calf tenderness  Neurologic: Ox3    Labs:   Recent Labs   Lab 24  1532 24  0646 24  0649   WBC 7.6 5.2  --    HGB 21.3* 17.8*  --    HCT 57.8* 48.4  --    .0 112.0*  --    CREATSERUM 1.59* 0.93 0.83   BUN 22 14 6*   * 138 135*   K 3.1* 3.4*  3.4* 3.3*  3.3*   CL 93* 103 104   CO2 30.0 27.0 24.0   * 85 82   CA 9.9 8.2* 8.5*   ALB 5.2* 3.9 3.8   ALKPHO 73 50 49   BILT 1.3* 0.9 0.9   TP 9.3* 6.6 6.5   * 419* 236*   ALT 1,561* 1,033* 715*       Recent Labs   Lab 24  1532   LIP 46        Imaging:  CT ABDOMEN+PELVIS(CONTRAST ONLY)(CPT=74177)    Result Date: 2024  CONCLUSION:   Mild colitis involving nearly the entire colon.  Infectious versus inflammatory etiologies are felt to be most likely.  Normal appendix.  No obstruction     Dictated by (CST): Hernandez Love MD on 2024 at 6:33 PM     Finalized by (CST): Hernandez Love MD on 2024 at 6:35 PM            Problem list:  Patient Active Problem List   Diagnosis    Transaminitis    Polycythemia       Assessment  Since his admission, his nausea, vomiting and abdominal pain have resolved.     Abdominal pain, nausea and vomiting  Acute colitis  Elevated liver enzymes     Stool positive for Cdif -Clostridium difficile  by EIA  negative  The most likely etiology for the patient's abdominal pain nausea, vomiting and diarrhea is an infectious gastroenteritis.  He has clinically improved with conservative treatment.His liver enzymes are improving. Tolerating diet.      The etiology of the elevated liver enzymes is unclear.  Acute hepatitis panel is negative.   Imaging of his liver, pancreas and biliary tree are normal. Mono Qual, ceruloplasmin, alpha 1  normal. KARON, ASMA, TTG, EBV pending  Ferritin 628.5 -> Iron levels, hereditary hemochromatosis DNA ordered  Mild thrombocytopenia    Plan:  1.Ok to discharge from Gi perspective  2. Follow up with GI in 2 weeks   3. Repeat liver enzymes 1 week    Is this a shared or split note between Advanced Practice Provider and Physician? Yes    Maryann Calero, ALHAJI    4/9/2024  7:38 AM

## 2024-04-09 NOTE — DISCHARGE SUMMARY
General Medicine Discharge Summary     Patient ID:  Darby Harding  28 year old  4/18/1995    Admit date: 4/7/2024    Discharge date and time: 04/09/24    Attending Physician: Amber Hermosillo DO     Consults: IP CONSULT TO GASTROENTEROLOGY  IP CONSULT TO SOCIAL WORK    Primary Care Physician: No primary care provider on file.     Reason for admission: elevated LFT    Risk of Readmission Lace+ Score: 19  59-90 High Risk  29-58 Medium Risk  0-28   Low Risk    Discharge Diagnoses: Polycythemia [D75.1]  Transaminitis [R74.01]  See Additional Discharge Diagnoses in Hospital Course    Discharged Condition: good    Follow-up with labs/images appointments:   F/u with GI in 2 weeks    Exam  Gen: No acute distress  Pulm: Lungs clear, normal respiratory effort  CV: Heart with regular rate and rhythm  Abd: Abdomen soft,   EXT: no edema     HPI: Patient is a 28 year old male with no significant past medical history who presented to the hospital with complaints of nausea, vomiting, diffuse abdominal pain and some diarrhea.  Patient states that his symptoms all started 5 days ago suddenly.  He describes projectile vomiting that was occurring both during the day and also when he would tend to sleep at night.  He had no appetite and therefore had decreased p.o. intake of both food and liquids.  Regarding his abdominal pain he describes it as crampy in nature and said that it was diffuse.  Felt like this pain will get worse when he vomited.  His diarrhea was 5 days ago and today he had a normal bowel movement.  Denied any blood in his emesis or stool.  Denied any sick contacts or any fevers.  Denied go out to eat anywhere recently and nobody around him has similar symptoms.  Upon arrival to the emergency department he was afebrile.  Lab work showed elevated LFTs with ALT 1561, , total bilirubin 1.3, and a creatinine of 1.59.  CBC showed a hemoglobin of 21.3.  CT abdomen pelvis was done which showed diffuse colitis.   Patient was admitted for further medical management.     Upon my evaluation of the patient this morning he is resting comfortably in bed.  Patient states that all the symptoms have resolved and he has not vomited since he was in the emergency department.  He has no abdominal pain.  His appetite has returned and he is requesting to eat.    Hospital Course:   Patient is a 28 year old male with no significant past medical history who presented to the hospital with complaints of nausea, vomiting, diffuse abdominal pain and some diarrhea. Admitted with elevated LFTs and seen by GI. CT A/P  showed diffuse colitis. Treated with IVF with improvement in LFT. Tested + for c dif, started on PO vancomycin. Dc in stable condition. F/u with GI in 2 weeks      Elevated LFT  - likely from dehydration and possible viral infection  - on admission LFT as follows: ALT 1561, , total bilirubin 1.3 and already improving  - hepatitis panel is negative  - CT A/P reviewed  - evaluated by GI colleagues, appreciate recommendations  - EBV, KARON, ASMA, alpha 1, TTG, ferritin and ceruloplasmin pending     C dif colitis  - appears infectious and already improving  - c dif +  - supportive care, no need for antibiotics     ROXANE  - 2/2 dehydration     Elevated hgb  - already improving, from hemoconcentration    Operative Procedures:      Imaging: CT ABDOMEN+PELVIS(CONTRAST ONLY)(CPT=74177)    Result Date: 4/7/2024  CONCLUSION:   Mild colitis involving nearly the entire colon.  Infectious versus inflammatory etiologies are felt to be most likely.  Normal appendix.  No obstruction     Dictated by (CST): Hernandez Love MD on 4/07/2024 at 6:33 PM     Finalized by (CST): Hernandez Love MD on 4/07/2024 at 6:35 PM             Disposition: home    Activity: activity as tolerated  Diet: regular diet  Wound Care: none needed  Code Status: No Order  O2:     Home Medication Changes:     Med list     Medication List        START taking these medications       vancomycin 125 MG Caps  Commonly known as: Vancocin  Take 1 capsule (125 mg total) by mouth 4 (four) times daily for 9 days.               Where to Get Your Medications        These medications were sent to [facility]/pharmacy #[id number] - [place], [place] - [address] [phone], [phone] [address] [facility] [address]      Phone: [phone]   vancomycin 125 MG Caps         FU   Follow-up Information       Paco Ding MD Follow up in 2 week(s).    Specialty: GASTROENTEROLOGY  Contact information:  [address] [address] [phone]                             MT instructions:      I reconciled current and discharge medications on day of discharge, discussed changes with patient and noted changes above.       Total Time Coordinating Care: Greater than 30 minutes    Patient had opportunity to ask questions and state understand and agree with therapeutic plan as outlined    Thank You,    Amber Hermosillo,    Hospitalist with [facility]

## 2024-04-09 NOTE — PLAN OF CARE
Patient alert and oriented. Patient to DC home this afternoon. IV removed. AVS reviewed with patient. No questions or concerns at this time. Patient sent home with paperwork and belongings. Patient escorted to entrance via nursing staff.     Problem: Patient Centered Care  Goal: Patient preferences are identified and integrated in the patient's plan of care  Description: Interventions:  - What would you like us to know as we care for you? From home with family  - Provide timely, complete, and accurate information to patient/family  - Incorporate patient and family knowledge, values, beliefs, and cultural backgrounds into the planning and delivery of care  - Encourage patient/family to participate in care and decision-making at the level they choose  - Honor patient and family perspectives and choices  Outcome: Adequate for Discharge     Problem: Patient/Family Goals  Goal: Patient/Family Long Term Goal  Description: Patient's Long Term Goal: go home    Interventions:  - discharge planning  -monitor labs and VS  - See additional Care Plan goals for specific interventions  Outcome: Adequate for Discharge  Goal: Patient/Family Short Term Goal  Description: Patient's Short Term Goal: pain relief    Interventions:   - monitor and assess pain  -PRN pain meds as ordered  - See additional Care Plan goals for specific interventions  Outcome: Adequate for Discharge     Problem: PAIN - ADULT  Goal: Verbalizes/displays adequate comfort level or patient's stated pain goal  Description: INTERVENTIONS:  - Encourage pt to monitor pain and request assistance  - Assess pain using appropriate pain scale  - Administer analgesics based on type and severity of pain and evaluate response  - Implement non-pharmacological measures as appropriate and evaluate response  - Consider cultural and social influences on pain and pain management  - Manage/alleviate anxiety  - Utilize distraction and/or relaxation techniques  - Monitor for opioid  side effects  - Notify MD/LIP if interventions unsuccessful or patient reports new pain  - Anticipate increased pain with activity and pre-medicate as appropriate  Outcome: Adequate for Discharge     Problem: GASTROINTESTINAL - ADULT  Goal: Minimal or absence of nausea and vomiting  Description: INTERVENTIONS:  - Maintain adequate hydration with IV or PO as ordered and tolerated  - Nasogastric tube to low intermittent suction as ordered  - Evaluate effectiveness of ordered antiemetic medications  - Provide nonpharmacologic comfort measures as appropriate  - Advance diet as tolerated, if ordered  - Obtain nutritional consult as needed  - Evaluate fluid balance  Outcome: Adequate for Discharge  Goal: Maintains or returns to baseline bowel function  Description: INTERVENTIONS:  - Assess bowel function  - Maintain adequate hydration with IV or PO as ordered and tolerated  - Evaluate effectiveness of GI medications  - Encourage mobilization and activity  - Obtain nutritional consult as needed  - Establish a toileting routine/schedule  - Consider collaborating with pharmacy to review patient's medication profile  Outcome: Adequate for Discharge  Goal: Maintains adequate nutritional intake (undernourished)  Description: INTERVENTIONS:  - Monitor percentage of each meal consumed  - Identify factors contributing to decreased intake, treat as appropriate  - Assist with meals as needed  - Monitor I&O, WT and lab values  - Obtain nutritional consult as needed  - Optimize oral hygiene and moisture  - Encourage food from home; allow for food preferences  - Enhance eating environment  Outcome: Adequate for Discharge

## 2024-04-10 ENCOUNTER — PATIENT OUTREACH (OUTPATIENT)
Dept: CASE MANAGEMENT | Age: 29
End: 2024-04-10

## 2024-04-10 LAB
EBV NA IGG SER QL IA: POSITIVE
EBV VCA IGG SER QL IA: POSITIVE
EBV VCA IGM SER QL IA: NEGATIVE

## 2024-07-01 ENCOUNTER — HOSPITAL ENCOUNTER (EMERGENCY)
Age: 29
Discharge: HOME | End: 2024-07-01
Attending: EMERGENCY MEDICINE

## 2024-07-01 VITALS
TEMPERATURE: 97.9 F | HEIGHT: 72 IN | OXYGEN SATURATION: 99 % | BODY MASS INDEX: 28.93 KG/M2 | RESPIRATION RATE: 16 BRPM | SYSTOLIC BLOOD PRESSURE: 142 MMHG | WEIGHT: 213.63 LBS | HEART RATE: 88 BPM | DIASTOLIC BLOOD PRESSURE: 97 MMHG

## 2024-07-01 DIAGNOSIS — U07.1 COVID-19 VIRUS DETECTED: Primary | ICD-10-CM

## 2024-07-01 LAB
FLUAV RNA RESP QL NAA+PROBE: NOT DETECTED
FLUBV RNA RESP QL NAA+PROBE: NOT DETECTED
RSV AG NPH QL IA.RAPID: NOT DETECTED
SARS-COV-2 N GENE CT SPEC QN NAA N2: 19.3
SARS-COV-2 RNA RESP QL NAA+PROBE: DETECTED
SERVICE CMNT-IMP: ABNORMAL

## 2024-07-01 PROCEDURE — 0241U COVID/FLU/RSV PANEL: CPT | Performed by: EMERGENCY MEDICINE

## 2024-07-01 SDOH — SOCIAL STABILITY: SOCIAL INSECURITY: HOW OFTEN DOES ANYONE, INCLUDING FAMILY AND FRIENDS, INSULT OR TALK DOWN TO YOU?: NEVER

## 2024-07-01 SDOH — SOCIAL STABILITY: SOCIAL INSECURITY: HOW OFTEN DOES ANYONE, INCLUDING FAMILY AND FRIENDS, SCREAM OR CURSE AT YOU?: NEVER

## 2024-07-01 SDOH — SOCIAL STABILITY: SOCIAL INSECURITY: HOW OFTEN DOES ANYONE, INCLUDING FAMILY AND FRIENDS, PHYSICALLY HURT YOU?: NEVER

## 2024-07-01 SDOH — SOCIAL STABILITY: SOCIAL INSECURITY: HOW OFTEN DOES ANYONE, INCLUDING FAMILY AND FRIENDS, THREATEN YOU WITH HARM?: NEVER

## 2024-07-01 ASSESSMENT — PAIN SCALES - GENERAL: PAINLEVEL_OUTOF10: 0
